# Patient Record
Sex: MALE | Race: OTHER
[De-identification: names, ages, dates, MRNs, and addresses within clinical notes are randomized per-mention and may not be internally consistent; named-entity substitution may affect disease eponyms.]

---

## 2019-02-18 ENCOUNTER — HOSPITAL ENCOUNTER (EMERGENCY)
Dept: HOSPITAL 52 - LL.ED | Age: 34
Discharge: HOME | End: 2019-02-18
Payer: COMMERCIAL

## 2019-02-18 DIAGNOSIS — S61.204A: Primary | ICD-10-CM
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
## 2019-02-18 DIAGNOSIS — Z23: ICD-10-CM

## 2019-02-18 NOTE — EDM.PDOC
ED HPI GENERAL MEDICAL PROBLEM





- General


Chief Complaint: Upper Extremity Injury/Pain


Stated Complaint: finger pad avulsion


Time Seen by Provider: 02/18/19 18:50


Source of Information: Reports: Patient, 


History Limitations: Reports: Language Barrier





- History of Present Illness


INITIAL COMMENTS - FREE TEXT/NARRATIVE: 





Patient comes to ER with acute fingertip injury of right ring finger. Got tip 

of finger caught in gate.


Unknown tetanus status.


Denies other injuries. 


Was at work when this happened. 


  ** right hand 4th digit


Pain Score (Numeric/FACES): 6





- Related Data


 Allergies











Allergy/AdvReac Type Severity Reaction Status Date / Time


 


No Known Allergies Allergy   Verified 02/18/19 18:38











Home Meds: 


 Home Meds





. [No Known Home Meds]  02/18/19 [History]











Past Medical History





- Past Health History


Medical/Surgical History: Denies Medical/Surgical History





Social & Family History





- Tobacco Use


Smoking Status *Q: Never Smoker





- Caffeine Use


Caffeine Use: Reports: Coffee





- Alcohol Use


Alcohol Use History: No





- Recreational Drug Use


Recreational Drug Use: No


Drug Use in Last 12 Months: No





Review of Systems





- Review of Systems


Review Of Systems: ROS reveals no pertinent complaints other than HPI.





ED EXAM, GENERAL





- Physical Exam


Exam: See Below


Exam Limited By: No Limitations


General Appearance: Alert, WD/WN, Moderate Distress


Eye Exam: Bilateral Eye: EOMI, PERRL


Nose: No: Nasal Deformity, Nasal Swelling


Throat/Mouth: Normal Voice, No Airway Compromise


Head: Atraumatic, Normocephalic


Neck: Supple


Respiratory/Chest: No Respiratory Distress


Cardiovascular: Normal Peripheral Pulses


Extremities: Normal Capillary Refill, Other (traumatic avulsion noted entire 

pad of right 4th finger. Approximately 1 inch across in each direction.  Nail 

is intact. No loss of tendon function noted. Bleeding controlled. Other fingers/

rest of right hand unremarkable in appearance)


Neurological: Alert, Oriented, Normal Cognition, Normal Gait


Psychiatric: Normal Affect, Normal Mood


Skin Exam: Wound/Incision (right ring finger as above)





Course





- Vital Signs


Last Recorded V/S: 


 Last Vital Signs











Temp  37.1 C   02/18/19 18:38


 


Pulse  69   02/18/19 18:38


 


Resp  18   02/18/19 18:38


 


BP  121/65   02/18/19 18:38


 


Pulse Ox  99   02/18/19 18:38














- Orders/Labs/Meds


Orders: 


 Active Orders 24 hr











 Category Date Time Status


 


 Vaccines to be Administered [RC] PER UNIT ROUTINE Care  02/18/19 18:51 Active


 


 Fingers Fourth Digit Rt F8 [CR] Stat Exams  02/18/19 18:50 Taken


 


 Sodium Chloride 0.9% [Saline Flush] Med  02/18/19 19:38 Active





 10 ml FLUSH ASDIRECTED PRN   


 


 Saline Lock Insert [OM.PC] Routine Oth  02/18/19 19:38 Ordered








 Medication Orders





Sodium Chloride (Saline Flush)  10 ml FLUSH ASDIRECTED PRN


   PRN Reason: Keep Vein Open








Meds: 


Medications











Generic Name Dose Route Start Last Admin





  Trade Name Freq  PRN Reason Stop Dose Admin


 


Sodium Chloride  10 ml  02/18/19 19:38  





  Saline Flush  FLUSH   





  ASDIRECTED PRN   





  Keep Vein Open   





     





     





     














Discontinued Medications














Generic Name Dose Route Start Last Admin





  Trade Name Freq  PRN Reason Stop Dose Admin


 


Diphtheria/Tetanus/Acell Pertussis  0.5 ml  02/18/19 18:51  02/18/19 19:40





  Adacel  IM  02/18/19 18:52  0.5 ml





  .ONCE ONE   Administration





     





     





     





     


 


Cefazolin Sodium 1 gm/ Sodium  100 mls @ 400 mls/hr  02/18/19 19:37  02/18/19 20

:07





  Chloride  IV  02/18/19 19:51  400 mls/hr





  ONETIME ONE   Administration





     





     





     





     


 


Tramadol HCl  50 mg  02/18/19 19:31  02/18/19 19:42





  Ultram  PO  02/18/19 19:32  50 mg





  ONETIME ONE   Administration





     





     





     





     














- Radiology Interpretation


Free Text/Narrative:: 





Xray confirms fracture through distal portion of right 4th phalanx, minimal 

displacement. 





- Re-Assessments/Exams


Free Text/Narrative Re-Assessment/Exam: 








4th finger avulsion injury with fracture of distal phalanx.


Call placed to [facility] and spoke to on call Ortho .


Treatment plan formulated.


Patient received IV Ancef this evening.  Wound soaked in Betadine and dressed 

using Xeroform.  


 will see the patient tomorrow morning at his clinic in [place]. 


Patient is to call right away at 8am and arrange time to be seen. 


 will evaluate the wound and determine further treatment plan and 

antibiotics. 


Tramadol PO given for pain. 





02/18/19 20:39


T#3 from ER stock given for PRN use for pain





Departure





- Departure


Time of Disposition: 21:00


Disposition: Home, Self-Care 01


Condition: Good


Clinical Impression: 


Avulsion of fingertip


Qualifiers:


 Encounter type: initial encounter Qualified Code(s): S61.209A - Unspecified 

open wound of unspecified finger without damage to nail, initial encounter








- Discharge Information


*PRESCRIPTION DRUG MONITORING PROGRAM REVIEWED*: Not Applicable


*COPY OF PRESCRIPTION DRUG MONITORING REPORT IN PATIENT ERLINDA: Not Applicable


Referrals: 


PCP,None [Primary Care Provider] - 


Forms:  ED Department Discharge


Additional Instructions: 


Call [facility] clinic tomorrow morning and tell them that we spoke to Dr. Lu


He said that he would see Ochoa on Tuesday morning (tomorrow)


Tell them that you need a morning appointment given the ride situation.


Do not remove the bandage that we placed on the finger. Let  remove 

it. 


Have  determine any further work restrictions for [facility]. 


Follow up as directed by .    will also determine what 

additional antibiotics you should continue taking.  You were given a single IV 

dose tonight. 





[facility] number:  [phone]





- My Orders


Last 24 Hours: 


My Active Orders





02/18/19 18:50


Fingers Fourth Digit Rt F8 [CR] Stat 





02/18/19 18:51


Vaccines to be Administered [RC] PER UNIT ROUTINE 





02/18/19 19:38


Sodium Chloride 0.9% [Saline Flush]   10 ml FLUSH ASDIRECTED PRN 


Saline Lock Insert [OM.PC] Routine 














- Assessment/Plan


Last 24 Hours: 


My Active Orders





02/18/19 18:50


Fingers Fourth Digit Rt F8 [CR] Stat 





02/18/19 18:51


Vaccines to be Administered [RC] PER UNIT ROUTINE 





02/18/19 19:38


Sodium Chloride 0.9% [Saline Flush]   10 ml FLUSH ASDIRECTED PRN 


Saline Lock Insert [OM.PC] Routine